# Patient Record
(demographics unavailable — no encounter records)

---

## 2025-01-02 NOTE — CONSULT LETTER
[Dear  ___] : Dear  [unfilled], [Consult Letter:] : I had the pleasure of evaluating your patient, [unfilled]. [Please see my note below.] : Please see my note below. [Consult Closing:] : Thank you very much for allowing me to participate in the care of this patient.  If you have any questions, please do not hesitate to contact me. [Sincerely,] : Sincerely, [FreeTextEntry2] : Dr Paul Winter [FreeTextEntry3] : Sincerely,   Yaya Kendall MD, FACP

## 2025-01-02 NOTE — HISTORY OF PRESENT ILLNESS
[FreeTextEntry1] : 70-year-old man with a history of hypertension, stage IIIa CKD, BPH with nocturia, hyperlipidemia.  His creatinine a year ago was 1.35, GFR 57, with a normal A1c and an excellent LDL of 70.  He has exhibited modest proteinuria.  He saw his urologist, Dr. Ding, in June, with a BP of 120/76.  He had failed alpha blockers but felt comfortable with his current BPH symptoms.  He feels generally well with no complaints.  He continues to exhibit modest proteinuria and microscopic hematuria as he has for many years.  There is no history of stones.  His renal function is stable with a creatinine of 1.28, GFR 60, K3.8, CO2 28, A1c 5.7.  He had his annual physical recently with Dr. Paul Winter and BP was good.  He remains on olmesartan/amlodipine/HCTZ.  Very rarely he will feel momentarily lightheaded upon standing.  I suggested fluids and/or salty pretzels should that last.  He joined the gym and is working out with some regularity.

## 2025-01-02 NOTE — ASSESSMENT
[FreeTextEntry1] : 70-year-old man with excellent BP control, stable renal function which is now virtually normal, and continuing modest proteinuria with microscopic hematuria.  BPH symptoms are stable and tolerable.  He will return in 9 to 12 months and will follow-up with Dr. Winter & Dr Ding.

## 2025-01-07 NOTE — END OF VISIT
[FreeTextEntry3] : I, Dr. Ding, personally performed the evaluation and management (E/M) services for this established patient who presents today with (a) new problem(s)/exacerbation of (an) existing condition(s). That E/M includes conducting the clinically appropriate interval history &/or exam, assessing all new/exacerbated conditions, and establishing a new plan of care. Today, my ROBBY, [email or web address], was here to observe my evaluation and management service for this new problem/exacerbated condition and follow the plan of care established by me going forward

## 2025-01-07 NOTE — PHYSICAL EXAM
[Normal Appearance] : normal appearance [Well Groomed] : well groomed [General Appearance - In No Acute Distress] : no acute distress [Respiration, Rhythm And Depth] : normal respiratory rhythm and effort [Exaggerated Use Of Accessory Muscles For Inspiration] : no accessory muscle use [Urethral Meatus] : meatus normal [Penis Abnormality] : normal circumcised penis [Scrotum] : the scrotum was normal [Testes Tenderness] : no tenderness of the testes [Testes Mass (___cm)] : there were no testicular masses [Normal Station and Gait] : the gait and station were normal for the patient's age [] : no rash [No Focal Deficits] : no focal deficits [Oriented To Time, Place, And Person] : oriented to person, place, and time [Affect] : the affect was normal [Mood] : the mood was normal [de-identified] : B/L 20cc testes, no masses

## 2025-01-07 NOTE — PHYSICAL EXAM
[Normal Appearance] : normal appearance [Well Groomed] : well groomed [General Appearance - In No Acute Distress] : no acute distress [Respiration, Rhythm And Depth] : normal respiratory rhythm and effort [Exaggerated Use Of Accessory Muscles For Inspiration] : no accessory muscle use [Urethral Meatus] : meatus normal [Penis Abnormality] : normal circumcised penis [Scrotum] : the scrotum was normal [Testes Tenderness] : no tenderness of the testes [Testes Mass (___cm)] : there were no testicular masses [Normal Station and Gait] : the gait and station were normal for the patient's age [] : no rash [No Focal Deficits] : no focal deficits [Oriented To Time, Place, And Person] : oriented to person, place, and time [Affect] : the affect was normal [Mood] : the mood was normal [de-identified] : B/L 20cc testes, no masses

## 2025-01-07 NOTE — HISTORY OF PRESENT ILLNESS
[FreeTextEntry1] : JUSTIN LORD is a 70 year M presenting on 01/07/2025 PMH: BPH, elevated PSA, CKD3, HTN, pos ERIK  6 mo F/U  BPH: minimal bother. No  meds Has to plan for bathroom when away from home. failed flomax/alfuzosin many years ago. Rezum denied x2 by insurance large bladder diverticulum Nocturia x1, slower FOS, occasional double voiding PVR to r/o retention: >675 mL repeat >516 mL Cysto 2018: The prostatic urethra was estimated to be 4 cm in length and had an enlargement of the lateral and median prostatic lobes. The bladder outlet was obstructed.  No ED concerns  No FHX prostate or bladder cancer  MRI prostate 11/5/24: PIRADS 2 low, BPH, large bladder diverticulum (10.5 cm), prostate volume 79.3  PSA: 4.09, 11/2024 4.41, 6/2024 4.09 11/2024 3.8 11/2023 3.4, 8/2020 5.57, 9/2019 4.78, 3/2019

## 2025-01-07 NOTE — ASSESSMENT
[FreeTextEntry1] : 69yo M elevated PSA, BPH significant urinary retention ~600cc known large/obstructing prostate minimal bother large bladder diverticulum refusing childers for now creat stable 1.35 no infections -UA, Ucx -recommending TURP -trial finasteride -PSA in 6 mo -F/U 3 mo

## 2025-01-07 NOTE — END OF VISIT
[FreeTextEntry3] : I, Dr. Ding, personally performed the evaluation and management (E/M) services for this established patient who presents today with (a) new problem(s)/exacerbation of (an) existing condition(s). That E/M includes conducting the clinically appropriate interval history &/or exam, assessing all new/exacerbated conditions, and establishing a new plan of care. Today, my ROBBY, [employer] Moon[employer], was here to observe my evaluation and management service for this new problem/exacerbated condition and follow the plan of care established by me going forward

## 2025-01-07 NOTE — ASSESSMENT
[FreeTextEntry1] : 71yo M elevated PSA, BPH significant urinary retention ~600cc known large/obstructing prostate minimal bother large bladder diverticulum refusing childers for now creat stable 1.35 no infections -UA, Ucx -recommending TURP -trial finasteride -PSA in 6 mo -F/U 3 mo